# Patient Record
Sex: FEMALE | Race: OTHER | Employment: OTHER | URBAN - METROPOLITAN AREA
[De-identification: names, ages, dates, MRNs, and addresses within clinical notes are randomized per-mention and may not be internally consistent; named-entity substitution may affect disease eponyms.]

---

## 2019-02-13 NOTE — PATIENT DISCUSSION
Monitor for changes. Explained condition and handout given to Pt today. Discussed symptoms of worsening and becoming more bothersome. Pt to call our office if vision changes or has increased symptoms.

## 2019-05-07 ENCOUNTER — NEW PATIENT COMPREHENSIVE (OUTPATIENT)
Dept: URBAN - METROPOLITAN AREA CLINIC 47 | Facility: CLINIC | Age: 64
End: 2019-05-07

## 2019-05-07 DIAGNOSIS — H43.812: ICD-10-CM

## 2019-05-07 DIAGNOSIS — H25.12: ICD-10-CM

## 2019-05-07 DIAGNOSIS — H52.7: ICD-10-CM

## 2019-05-07 DIAGNOSIS — H25.11: ICD-10-CM

## 2019-05-07 PROCEDURE — 92015 DETERMINE REFRACTIVE STATE: CPT

## 2019-05-07 PROCEDURE — 92004 COMPRE OPH EXAM NEW PT 1/>: CPT

## 2019-05-07 ASSESSMENT — VISUAL ACUITY
OS_CC: 20/50+1
OD_SC: 20/200
OD_CC: J1
OS_SC: 20/200
OS_CC: J1
OD_PH: 20/50-1
OD_CC: 20/50+2
OS_PH: 20/50

## 2019-05-07 ASSESSMENT — TONOMETRY
OS_IOP_MMHG: 14
OD_IOP_MMHG: 14

## 2019-05-14 ENCOUNTER — CATARACT CONSULT (OUTPATIENT)
Dept: URBAN - METROPOLITAN AREA CLINIC 43 | Facility: CLINIC | Age: 64
End: 2019-05-14

## 2019-05-14 DIAGNOSIS — H25.813: ICD-10-CM

## 2019-05-14 DIAGNOSIS — H43.812: ICD-10-CM

## 2019-05-14 PROCEDURE — 99203 OFFICE O/P NEW LOW 30 MIN: CPT

## 2019-05-14 ASSESSMENT — VISUAL ACUITY
OS_BAT: 20/40
OS_CC: 20/30
OD_BAT: 20/40
OD_CC: J1
OD_CC: 20/30+2
OS_CC: J1
OS_SC: 20/100
OD_SC: 20/100-1

## 2019-05-14 ASSESSMENT — TONOMETRY
OD_IOP_MMHG: 13
OS_IOP_MMHG: 13

## 2020-09-15 NOTE — PATIENT DISCUSSION
Monitor for macular changes with testing. Advised patient to call our office with decreased vision or increased symptoms.  MAC OCT prn vision changes or Dr's request.

## 2021-01-04 NOTE — PATIENT DISCUSSION
RTC in 1 year for Exam and OCT ONH, sooner if problems or changes. Refill request sent for Claritin.

## 2021-01-04 NOTE — PATIENT DISCUSSION
Explained condition and discussed symptoms of worsening and becoming more bothersome. Handout given to Pt today.

## 2021-11-30 ENCOUNTER — PREPPED CHART (OUTPATIENT)
Dept: URBAN - METROPOLITAN AREA CLINIC 47 | Facility: CLINIC | Age: 66
End: 2021-11-30

## 2023-01-05 NOTE — PATIENT DISCUSSION
Monitor for macular changes with visits and Mac OCTs or Optos. Eat healthy, and wear sunglasses. Call with any vision changes or increased symptoms.

## 2025-01-23 NOTE — PATIENT DISCUSSION
Monitor for macular changes with testing. Advised patient to call our office with decreased vision or increased symptoms.  MAC OCT prn vision changes or Dr's request. Patient does not appear to be in any acute distress/shows no evidence of clinical instability at this time.     Provider has reviewed discharge instructions with the patient/family.  The patient/family verbalized understanding instructions as well as need for follow up for any further symptoms.     Discharge papers given, education provided, and any questions answered. Patient discharged by provider.